# Patient Record
Sex: FEMALE | Race: OTHER
[De-identification: names, ages, dates, MRNs, and addresses within clinical notes are randomized per-mention and may not be internally consistent; named-entity substitution may affect disease eponyms.]

---

## 2017-11-27 ENCOUNTER — HOSPITAL ENCOUNTER (EMERGENCY)
Dept: HOSPITAL 61 - ER | Age: 37
Discharge: HOME | End: 2017-11-27
Payer: SELF-PAY

## 2017-11-27 VITALS — BODY MASS INDEX: 22.5 KG/M2 | HEIGHT: 66 IN | WEIGHT: 140 LBS

## 2017-11-27 VITALS — SYSTOLIC BLOOD PRESSURE: 107 MMHG | DIASTOLIC BLOOD PRESSURE: 54 MMHG

## 2017-11-27 DIAGNOSIS — R10.30: ICD-10-CM

## 2017-11-27 DIAGNOSIS — O26.891: ICD-10-CM

## 2017-11-27 DIAGNOSIS — O20.9: Primary | ICD-10-CM

## 2017-11-27 DIAGNOSIS — Z3A.01: ICD-10-CM

## 2017-11-27 LAB
ANION GAP SERPL CALC-SCNC: 6 MMOL/L (ref 6–14)
BACTERIA #/AREA URNS HPF: 0 /HPF
BASOPHILS # BLD AUTO: 0.1 X10^3/UL (ref 0–0.2)
BASOPHILS NFR BLD: 1 % (ref 0–3)
BILIRUB UR QL STRIP: NEGATIVE
BUN SERPL-MCNC: 14 MG/DL (ref 7–20)
CALCIUM SERPL-MCNC: 9.2 MG/DL (ref 8.5–10.1)
CHLORIDE SERPL-SCNC: 105 MMOL/L (ref 98–107)
CO2 SERPL-SCNC: 29 MMOL/L (ref 21–32)
CREAT SERPL-MCNC: 0.6 MG/DL (ref 0.6–1)
EOSINOPHIL NFR BLD: 4 % (ref 0–3)
ERYTHROCYTE [DISTWIDTH] IN BLOOD BY AUTOMATED COUNT: 13.3 % (ref 11.5–14.5)
GFR SERPLBLD BASED ON 1.73 SQ M-ARVRAT: 113.1 ML/MIN
GLUCOSE SERPL-MCNC: 98 MG/DL (ref 70–99)
GLUCOSE UR STRIP-MCNC: NEGATIVE MG/DL
HCT VFR BLD CALC: 34.4 % (ref 36–47)
HGB BLD-MCNC: 11.6 G/DL (ref 12–15.5)
LYMPHOCYTES # BLD: 1.8 X10^3/UL (ref 1–4.8)
LYMPHOCYTES NFR BLD AUTO: 33 % (ref 24–48)
MCH RBC QN AUTO: 32 PG (ref 25–35)
MCHC RBC AUTO-ENTMCNC: 34 G/DL (ref 31–37)
MCV RBC AUTO: 94 FL (ref 79–100)
MONOCYTES NFR BLD: 8 % (ref 0–9)
NEUTROPHILS NFR BLD AUTO: 54 % (ref 31–73)
NITRITE UR QL STRIP: NEGATIVE
PH UR STRIP: 6.5 [PH]
PLATELET # BLD AUTO: 288 X10^3/UL (ref 140–400)
POTASSIUM SERPL-SCNC: 4 MMOL/L (ref 3.5–5.1)
PROT UR STRIP-MCNC: NEGATIVE MG/DL
RBC # BLD AUTO: 3.66 X10^6/UL (ref 3.5–5.4)
RBC #/AREA URNS HPF: (no result) /HPF (ref 0–2)
SODIUM SERPL-SCNC: 140 MMOL/L (ref 136–145)
SP GR UR STRIP: 1.02
SQUAMOUS #/AREA URNS LPF: (no result) /LPF
UROBILINOGEN UR-MCNC: 1 MG/DL
WBC # BLD AUTO: 5.5 X10^3/UL (ref 4–11)
WBC #/AREA URNS HPF: (no result) /HPF (ref 0–4)

## 2017-11-27 PROCEDURE — 36415 COLL VENOUS BLD VENIPUNCTURE: CPT

## 2017-11-27 PROCEDURE — 81025 URINE PREGNANCY TEST: CPT

## 2017-11-27 PROCEDURE — 76801 OB US < 14 WKS SINGLE FETUS: CPT

## 2017-11-27 PROCEDURE — 87086 URINE CULTURE/COLONY COUNT: CPT

## 2017-11-27 PROCEDURE — 81001 URINALYSIS AUTO W/SCOPE: CPT

## 2017-11-27 PROCEDURE — 80048 BASIC METABOLIC PNL TOTAL CA: CPT

## 2017-11-27 PROCEDURE — 86901 BLOOD TYPING SEROLOGIC RH(D): CPT

## 2017-11-27 PROCEDURE — 84702 CHORIONIC GONADOTROPIN TEST: CPT

## 2017-11-27 PROCEDURE — 86850 RBC ANTIBODY SCREEN: CPT

## 2017-11-27 PROCEDURE — 85025 COMPLETE CBC W/AUTO DIFF WBC: CPT

## 2017-11-27 PROCEDURE — 86900 BLOOD TYPING SEROLOGIC ABO: CPT

## 2017-11-27 NOTE — RAD
OB ultrasound less than 14 weeks and transvaginal OB ultrasound

 

HISTORY: Abdominal pain vaginal bleeding and pregnancy

 

Sonographic examination of the pregnancy was performed by transabdominal 

and endovaginal technique. Multiple static images were obtained.

 

OB ultrasound first trimester:

 

The uterus appears normal. There is no gestational sac seen.

 

OB ultrasound transvaginal:

 

The endometrium of the uterus measures 10 mm in thickness. The right ovary

appears normal with normal blood flow measures 1.1 x 2.4 x 1.1 cm. The 

left ovary appears normal with normal blood flow measures 1.4 x 2.7 x 1.8 

cm.

 

There is no free fluid.

 

IMPRESSION:

 

No sonographic evidence of pregnancy. An early less than 4 weeks pregnancy

is possible. Miscarriage or blighted ovum is possible.

 

Recommend correlation with serial quantitative beta-hCG. If the pregnancy 

progresses beyond 6 weeks a repeat ultrasound is recommended in order to 

document an intrauterine pregnancy and thereby exclude ectopic pregnancy.

 

Electronically signed by: Steve Etienne III, MD (11/27/2017 7:06 PM) [facility]

## 2017-11-27 NOTE — PHYS DOC
Past Medical History


Past Medical History:  No Pertinent History


Past Surgical History:  No Surgical History


Alcohol Use:  None


Drug Use:  None





Adult General


Chief Complaint


Chief Complaint:  VAGINAL BLEEDING PREGNANCY





HPI


HPI





Patient is a 37  year old female who presents with vaginal bleeding in early 

pregnancy.  The patient is , pregnant at 10w2d by dates (LMP ), reports 

1 week ago she had vaginal bleeding with clots & cramping lower abdominal pain.

  She is concerned that she experienced miscarriage.  Currently no bleeding or 

pain.  Denies fevers/chills, nausea, vomiting, diarrhea, dysuria/hematuria, 

vaginal discharge.  She went to a clinic this morning & was instructed to come 

here for further evaluation.





Review of Systems


Review of Systems


Constitutional: Denies fever or chills 


HENT: Denies nasal congestion or sore throat 


Respiratory: Denies cough or shortness of breath 


Cardiovascular:  Denies chest pain


GI: Reports history of abdominal pain, denies nausea, vomiting


: Denies dysuria or hematuria, reports history of vaginal bleeding. 


Musculoskeletal: Denies back pain or joint pain 


Integument: Denies rash or skin lesions 


Neurologic: Denies headache, focal weakness or sensory changes 


All other systems were reviewed and found to be within normal limits, except as 

documented in this note.





Allergies


Allergies





Allergies








Coded Allergies Type Severity Reaction Last Updated Verified


 


  No Known Drug Allergies    17 No











Physical Exam


Physical Exam


Constitutional: Well developed, well nourished, no acute distress, non-toxic 

appearance. 


HENT: Normocephalic, atraumatic, bilateral external ears normal, oropharynx 

moist, nose normal.


Eyes: conjunctiva normal, no discharge.


Neck: supple, no stridor.


Cardiovascular:  RRR, no murmurs, no edema. 


Lungs & Thorax:  LCTAB, no wheezing, no respiratory distress.


Abdomen: soft, nontender, nondistended.  no masses or pulsatile masses, no 

rebound/guarding.


Skin: Warm, dry, no erythema, no rash.


Back: No CVA tenderness.


Extremities: No tenderness, no edema. 


Neurologic: Alert and oriented X 3, no focal deficits noted. 


Psychologic: Affect normal, judgement normal, mood normal.





Current Patient Data


Vital Signs





 Vital Signs








  Date Time  Temp Pulse Resp B/P (MAP) Pulse Ox O2 Delivery O2 Flow Rate FiO2


 


17 20:57  78 20 107/54 (71) 98 Room Air  


 


17 17:30 98.4       





 98.4       








Lab Values





 Laboratory Tests








Test


  17


17:24 17


17:28 17


17:51


 


Urine Collection Type Unknown    


 


Urine Color Yellow    


 


Urine Clarity Clear    


 


Urine pH 6.5    


 


Urine Specific Gravity 1.025    


 


Urine Protein


  Negative mg/dL


(NEG-TRACE) 


  


 


 


Urine Glucose (UA)


  Negative mg/dL


(NEG) 


  


 


 


Urine Ketones (Stick)


  Negative mg/dL


(NEG) 


  


 


 


Urine Blood Small (NEG)    


 


Urine Nitrite


  Negative (NEG)


  


  


 


 


Urine Bilirubin


  Negative (NEG)


  


  


 


 


Urine Urobilinogen Dipstick


  1.0 mg/dL (0.2


mg/dL) 


  


 


 


Urine Leukocyte Esterase Small (NEG)    


 


Urine RBC


  3-5 /HPF (0-2)


  


  


 


 


Urine WBC


  1-4 /HPF (0-4)


  


  


 


 


Urine Squamous Epithelial


Cells Many /LPF  


  


  


 


 


Urine Amorphous Sediment Present /HPF    


 


Urine Bacteria


  0 /HPF (0-FEW)


  


  


 


 


Urine Mucus Marked /LPF    


 


POC Urine HCG, Qualitative


  


  Hcg positive


(Negative) 


 


 


White Blood Count


  


  


  5.5 x10^3/uL


(4.0-11.0)


 


Red Blood Count


  


  


  3.66 x10^6/uL


(3.50-5.40)


 


Hemoglobin


  


  


  11.6 g/dL


(12.0-15.5)  L


 


Hematocrit


  


  


  34.4 %


(36.0-47.0)  L


 


Mean Corpuscular Volume


  


  


  94 fL ()


 


 


Mean Corpuscular Hemoglobin   32 pg (25-35)  


 


Mean Corpuscular Hemoglobin


Concent 


  


  34 g/dL


(31-37)


 


Red Cell Distribution Width


  


  


  13.3 %


(11.5-14.5)


 


Platelet Count


  


  


  288 x10^3/uL


(140-400)


 


Neutrophils (%) (Auto)   54 % (31-73)  


 


Lymphocytes (%) (Auto)   33 % (24-48)  


 


Monocytes (%) (Auto)   8 % (0-9)  


 


Eosinophils (%) (Auto)   4 % (0-3)  H


 


Basophils (%) (Auto)   1 % (0-3)  


 


Neutrophils # (Auto)


  


  


  3.0 x10^3uL


(1.8-7.7)


 


Lymphocytes # (Auto)


  


  


  1.8 x10^3/uL


(1.0-4.8)


 


Monocytes # (Auto)


  


  


  0.4 x10^3/uL


(0.0-1.1)


 


Eosinophils # (Auto)


  


  


  0.2 x10^3/uL


(0.0-0.7)


 


Basophils # (Auto)


  


  


  0.1 x10^3/uL


(0.0-0.2)


 


Maternal Serum HCG Beta


Subunit 


  


  442 mIU/mL


(0-5)  H


 


Sodium Level


  


  


  140 mmol/L


(136-145)


 


Potassium Level


  


  


  4.0 mmol/L


(3.5-5.1)


 


Chloride Level


  


  


  105 mmol/L


()


 


Carbon Dioxide Level


  


  


  29 mmol/L


(21-32)


 


Anion Gap   6 (6-14)  


 


Blood Urea Nitrogen


  


  


  14 mg/dL


(7-20)


 


Creatinine


  


  


  0.6 mg/dL


(0.6-1.0)


 


Estimated GFR


(Cockcroft-Gault) 


  


  113.1  


 


 


Glucose Level


  


  


  98 mg/dL


(70-99)


 


Calcium Level


  


  


  9.2 mg/dL


(8.5-10.1)





 Laboratory Tests


17 17:51








 Laboratory Tests


17 17:51











Microbiology


17 Urine Culture - Preliminary, Resulted


           


17 Urine Culture Result 1 (STEPHAN) - Preliminary, Resulted


           








EKG


EKG


[]





Radiology/Procedures


Radiology/Procedures


PROCEDURE: OB < 14 WKS





OB ultrasound less than 14 weeks and transvaginal OB ultrasound


 


HISTORY: Abdominal pain vaginal bleeding and pregnancy


 


Sonographic examination of the pregnancy was performed by transabdominal 


and endovaginal technique. Multiple static images were obtained.


 


OB ultrasound first trimester:


 


The uterus appears normal. There is no gestational sac seen.


 


OB ultrasound transvaginal:


 


The endometrium of the uterus measures 10 mm in thickness. The right ovary


appears normal with normal blood flow measures 1.1 x 2.4 x 1.1 cm. The 


left ovary appears normal with normal blood flow measures 1.4 x 2.7 x 1.8 


cm.


 


There is no free fluid.


 


IMPRESSION:


 


No sonographic evidence of pregnancy. An early less than 4 weeks pregnancy


is possible. Miscarriage or blighted ovum is possible.


 


Recommend correlation with serial quantitative beta-hCG. If the pregnancy 


progresses beyond 6 weeks a repeat ultrasound is recommended in order to 


document an intrauterine pregnancy and thereby exclude ectopic pregnancy.


 


Electronically signed by: Cassius Merritt III, MD (2017 7:06 PM) [facility]














DICTATED and SIGNED BY:     CASSIUS MERRITT III, MD


DATE:     17[]





Course & Med Decision Making


Course & Med Decision Making


Pertinent Labs and Imaging studies reviewed. (See chart for details)


The patient presents for evaluation after abdominal pain & vaginal bleeding in 

first trimester, currently asymptomatic.  Vitals stable.  Obtained labs, UA, 

ultrasound.  No evidence of IUP.  Based on history & LMP, likely consistent 

with spontaneous , completed, but still possibility of early IUP or 

ectopic pregnancy.  Discussed with patient, very important to follow up in 2 

days for repeat BHCG.  May make appointment with Dr. Rice or OB of her choice 

if she can be seen quickly.  Come back for high fever, severe pain, 

uncontrolled vomiting, heavy bleeding requiring greater than 1 pad per hour, 

any otherwise worsening condition.  Discharged home in stable condition.


[]





Dragon Disclaimer


Dragon Disclaimer


This electronic medical record was generated, in whole or in part, using a 

voice recognition dictation system.





Departure


Departure


Impression:  


 Primary Impression:  


 Vaginal bleeding affecting early pregnancy


Disposition:   HOME, SELF-CARE


Condition:  STABLE


Referrals:  


UNKNOWN PCP NAME (PCP)








HILLARY RICE Jr, MD


Patient Instructions:  Vaginal Bleeding During Pregnancy, First Trimester





Additional Instructions:  


You were seen in the emergency department today for vaginal bleeding during 

pregnancy. Based on the date severe last period, we would expect a higher 

pregnancy hormone level. We will would also expect to be able to see a 

developing baby on your ultrasound. Based on the symptoms you have last week, 

it is likely that you had an ultrasound. However there is still a possibility 

that this could be very early pregnancy or a pregnancy developing within your 

tubes which can be life-threatening. It is very important to follow-up with an 

OB doctor for further labs and possibly ultrasound evaluation. Please follow-up 

at the clinic or with Dr. Rice here at [place] within 2 days. Return to 

the emergency department for high fever, severe pain, uncontrolled vomiting, 

heavy bleeding requiring more than 1 pad per hour, any otherwise worsening 

condition.











PLACIDO DIEZ MD 2017 20:28

## 2018-10-08 ENCOUNTER — HOSPITAL ENCOUNTER (INPATIENT)
Dept: HOSPITAL 61 - 3 SO LND | Age: 38
LOS: 2 days | Discharge: HOME | End: 2018-10-10
Attending: SPECIALIST | Admitting: SPECIALIST
Payer: SELF-PAY

## 2018-10-08 VITALS — HEIGHT: 66 IN | BODY MASS INDEX: 28.12 KG/M2 | WEIGHT: 175 LBS

## 2018-10-08 VITALS — SYSTOLIC BLOOD PRESSURE: 110 MMHG | DIASTOLIC BLOOD PRESSURE: 90 MMHG

## 2018-10-08 VITALS — SYSTOLIC BLOOD PRESSURE: 116 MMHG | DIASTOLIC BLOOD PRESSURE: 77 MMHG

## 2018-10-08 VITALS — SYSTOLIC BLOOD PRESSURE: 112 MMHG | DIASTOLIC BLOOD PRESSURE: 76 MMHG

## 2018-10-08 VITALS — DIASTOLIC BLOOD PRESSURE: 76 MMHG | SYSTOLIC BLOOD PRESSURE: 111 MMHG

## 2018-10-08 DIAGNOSIS — Z3A.40: ICD-10-CM

## 2018-10-08 DIAGNOSIS — Z30.2: ICD-10-CM

## 2018-10-08 LAB
AMPHETAMINE/METHAMPHETAMINE: (no result)
APTT PPP: YELLOW S
BACTERIA #/AREA URNS HPF: (no result) /HPF
BARBITURATES UR-MCNC: (no result) UG/ML
BASOPHILS # BLD AUTO: 0 X10^3/UL (ref 0–0.2)
BASOPHILS NFR BLD: 1 % (ref 0–3)
BENZODIAZ UR-MCNC: (no result) UG/L
BILIRUB UR QL STRIP: NEGATIVE
CANNABINOIDS UR-MCNC: (no result) UG/L
COCAINE UR-MCNC: (no result) NG/ML
EOSINOPHIL NFR BLD: 0.2 X10^3/UL (ref 0–0.7)
EOSINOPHIL NFR BLD: 3 % (ref 0–3)
ERYTHROCYTE [DISTWIDTH] IN BLOOD BY AUTOMATED COUNT: 13.1 % (ref 11.5–14.5)
FIBRINOGEN PPP-MCNC: CLEAR MG/DL
HCT VFR BLD CALC: 33.8 % (ref 36–47)
HGB BLD-MCNC: 11.7 G/DL (ref 12–15.5)
LYMPHOCYTES # BLD: 1.5 X10^3/UL (ref 1–4.8)
LYMPHOCYTES NFR BLD AUTO: 26 % (ref 24–48)
MCH RBC QN AUTO: 35 PG (ref 25–35)
MCHC RBC AUTO-ENTMCNC: 35 G/DL (ref 31–37)
MCV RBC AUTO: 101 FL (ref 79–100)
METHADONE SERPL-MCNC: (no result) NG/ML
MONO #: 0.5 X10^3/UL (ref 0–1.1)
MONOCYTES NFR BLD: 9 % (ref 0–9)
NEUT #: 3.7 X10^3UL (ref 1.8–7.7)
NEUTROPHILS NFR BLD AUTO: 62 % (ref 31–73)
NITRITE UR QL STRIP: NEGATIVE
OPIATES UR-MCNC: (no result) NG/ML
PCP SERPL-MCNC: (no result) MG/DL
PH UR STRIP: 5.5 [PH]
PLATELET # BLD AUTO: 174 X10^3/UL (ref 140–400)
PROT UR STRIP-MCNC: NEGATIVE MG/DL
RBC # BLD AUTO: 3.36 X10^6/UL (ref 3.5–5.4)
RBC #/AREA URNS HPF: 0 /HPF (ref 0–2)
SQUAMOUS #/AREA URNS LPF: (no result) /LPF
UROBILINOGEN UR-MCNC: 0.2 MG/DL
WBC # BLD AUTO: 5.9 X10^3/UL (ref 4–11)

## 2018-10-08 PROCEDURE — G0479 DRUG TEST PRESUMP NOT OPT: HCPCS

## 2018-10-08 PROCEDURE — 85025 COMPLETE CBC W/AUTO DIFF WBC: CPT

## 2018-10-08 PROCEDURE — 86592 SYPHILIS TEST NON-TREP QUAL: CPT

## 2018-10-08 PROCEDURE — 0UB70ZZ EXCISION OF BILATERAL FALLOPIAN TUBES, OPEN APPROACH: ICD-10-PCS | Performed by: SPECIALIST

## 2018-10-08 PROCEDURE — 86850 RBC ANTIBODY SCREEN: CPT

## 2018-10-08 PROCEDURE — 10907ZC DRAINAGE OF AMNIOTIC FLUID, THERAPEUTIC FROM PRODUCTS OF CONCEPTION, VIA NATURAL OR ARTIFICIAL OPENING: ICD-10-PCS | Performed by: SPECIALIST

## 2018-10-08 PROCEDURE — 87086 URINE CULTURE/COLONY COUNT: CPT

## 2018-10-08 PROCEDURE — 0UQGXZZ REPAIR VAGINA, EXTERNAL APPROACH: ICD-10-PCS | Performed by: SPECIALIST

## 2018-10-08 PROCEDURE — 3E033VJ INTRODUCTION OF OTHER HORMONE INTO PERIPHERAL VEIN, PERCUTANEOUS APPROACH: ICD-10-PCS | Performed by: SPECIALIST

## 2018-10-08 PROCEDURE — 36415 COLL VENOUS BLD VENIPUNCTURE: CPT

## 2018-10-08 PROCEDURE — 86900 BLOOD TYPING SEROLOGIC ABO: CPT

## 2018-10-08 PROCEDURE — 3E0P7VZ INTRODUCTION OF HORMONE INTO FEMALE REPRODUCTIVE, VIA NATURAL OR ARTIFICIAL OPENING: ICD-10-PCS | Performed by: SPECIALIST

## 2018-10-08 PROCEDURE — 80307 DRUG TEST PRSMV CHEM ANLYZR: CPT

## 2018-10-08 PROCEDURE — 85014 HEMATOCRIT: CPT

## 2018-10-08 PROCEDURE — 86901 BLOOD TYPING SEROLOGIC RH(D): CPT

## 2018-10-08 PROCEDURE — 88302 TISSUE EXAM BY PATHOLOGIST: CPT

## 2018-10-08 PROCEDURE — 90756 CCIIV4 VACC ABX FREE IM: CPT

## 2018-10-08 PROCEDURE — S0028 INJECTION, FAMOTIDINE, 20 MG: HCPCS

## 2018-10-08 PROCEDURE — 81001 URINALYSIS AUTO W/SCOPE: CPT

## 2018-10-08 PROCEDURE — 90471 IMMUNIZATION ADMIN: CPT

## 2018-10-08 RX ADMIN — LIDOCAINE HYDROCHLORIDE PRN ML: 10 INJECTION, SOLUTION EPIDURAL; INFILTRATION; INTRACAUDAL; PERINEURAL at 16:31

## 2018-10-08 RX ADMIN — LIDOCAINE HYDROCHLORIDE PRN ML: 10 INJECTION, SOLUTION EPIDURAL; INFILTRATION; INTRACAUDAL; PERINEURAL at 16:32

## 2018-10-08 RX ADMIN — FENTANYL CITRATE PRN MCG: 50 INJECTION INTRAMUSCULAR; INTRAVENOUS at 12:56

## 2018-10-08 RX ADMIN — FENTANYL CITRATE PRN MCG: 50 INJECTION INTRAMUSCULAR; INTRAVENOUS at 16:31

## 2018-10-08 RX ADMIN — IBUPROFEN PRN MG: 800 TABLET ORAL at 16:31

## 2018-10-08 NOTE — PDOC
VAGINAL DELIVERY


DATE


DATE: 10/8/18 


TIME: 14:54


:  5


Para:  4


EDC:  Oct 8, 2018


VAGINAL DELIVERY:  VTX


VACCUM ASSISTED:  No


PLACENTA:  Spontaneous


APGAR





SEX:  Female


WEIGHT


Weight [ ]


Nuchal Cord:  No


Amniotic Fluid:  Clear


PAIN:  Natural


EPISIOTOMY:  No


EXTENSION:  No


EBL


300cc


COMPLICATIONS


none


CONDITION


Stable


Signs of Intrauterine Infectio:  None


Shoulder Dystocia:  No


DIAGNOSIS


ADALBERTO Watson MD Oct 8, 2018 14:55

## 2018-10-09 VITALS — DIASTOLIC BLOOD PRESSURE: 64 MMHG | SYSTOLIC BLOOD PRESSURE: 105 MMHG

## 2018-10-09 VITALS — DIASTOLIC BLOOD PRESSURE: 76 MMHG | SYSTOLIC BLOOD PRESSURE: 112 MMHG

## 2018-10-09 VITALS — SYSTOLIC BLOOD PRESSURE: 104 MMHG | DIASTOLIC BLOOD PRESSURE: 68 MMHG

## 2018-10-09 VITALS — DIASTOLIC BLOOD PRESSURE: 75 MMHG | SYSTOLIC BLOOD PRESSURE: 113 MMHG

## 2018-10-09 VITALS — DIASTOLIC BLOOD PRESSURE: 77 MMHG | SYSTOLIC BLOOD PRESSURE: 115 MMHG

## 2018-10-09 VITALS — SYSTOLIC BLOOD PRESSURE: 110 MMHG | DIASTOLIC BLOOD PRESSURE: 80 MMHG

## 2018-10-09 VITALS — DIASTOLIC BLOOD PRESSURE: 66 MMHG | SYSTOLIC BLOOD PRESSURE: 110 MMHG

## 2018-10-09 VITALS — DIASTOLIC BLOOD PRESSURE: 79 MMHG | SYSTOLIC BLOOD PRESSURE: 120 MMHG

## 2018-10-09 VITALS — DIASTOLIC BLOOD PRESSURE: 77 MMHG | SYSTOLIC BLOOD PRESSURE: 116 MMHG

## 2018-10-09 RX ADMIN — FENTANYL CITRATE PRN MCG: 50 INJECTION INTRAMUSCULAR; INTRAVENOUS at 11:11

## 2018-10-09 RX ADMIN — BENZOCAINE AND MENTHOL PRN LOZ: 15; 3.6 LOZENGE ORAL at 19:34

## 2018-10-09 RX ADMIN — SODIUM CHLORIDE, SODIUM LACTATE, POTASSIUM CHLORIDE, AND CALCIUM CHLORIDE SCH MLS/HR: .6; .31; .03; .02 INJECTION, SOLUTION INTRAVENOUS at 08:55

## 2018-10-09 RX ADMIN — IBUPROFEN PRN MG: 800 TABLET ORAL at 18:22

## 2018-10-09 RX ADMIN — SODIUM CHLORIDE, SODIUM LACTATE, POTASSIUM CHLORIDE, AND CALCIUM CHLORIDE SCH MLS/HR: .6; .31; .03; .02 INJECTION, SOLUTION INTRAVENOUS at 11:10

## 2018-10-09 RX ADMIN — IBUPROFEN PRN MG: 800 TABLET ORAL at 00:28

## 2018-10-09 RX ADMIN — FENTANYL CITRATE PRN MCG: 50 INJECTION INTRAMUSCULAR; INTRAVENOUS at 10:58

## 2018-10-10 VITALS — SYSTOLIC BLOOD PRESSURE: 116 MMHG | DIASTOLIC BLOOD PRESSURE: 69 MMHG

## 2018-10-10 VITALS — SYSTOLIC BLOOD PRESSURE: 112 MMHG | DIASTOLIC BLOOD PRESSURE: 76 MMHG

## 2018-10-10 VITALS — DIASTOLIC BLOOD PRESSURE: 73 MMHG | SYSTOLIC BLOOD PRESSURE: 114 MMHG

## 2018-10-10 RX ADMIN — BENZOCAINE AND MENTHOL PRN LOZ: 15; 3.6 LOZENGE ORAL at 08:17

## 2018-10-10 RX ADMIN — BENZOCAINE AND MENTHOL PRN LOZ: 15; 3.6 LOZENGE ORAL at 12:07

## 2018-10-10 RX ADMIN — IBUPROFEN PRN MG: 800 TABLET ORAL at 08:17

## 2018-10-10 NOTE — PATHOLOGY
[facility] Accession Number: [id number]

.                                                                01

Material submitted:                                        .

PART A: RIGHT FALLOPIAN TUBE

PART B: LEFT FALLOPIAN TUBE

.                                                                01

Clinician provided ICD-10:

Z30.2

.                                                                01

Clinical history:                                          .

Postpartum tubal ligation

.                                                                02

**********************************************************************

Diagnosis:

A.  Right tubal ligation:

- Segment of fallopian tube confirmed.

.

B.  Left tubal ligation:

- Segment of fallopian tube confirmed.

.

(JPM:carmen; 10/10/2018)

MBR/10/10/2018

**********************************************************************

.                                                                02

Electronically signed:                                     .

Yury Vu MD, Pathologist

NPI- [id number]

.                                                                01

Gross description:                                         .

A. The specimen is received in formalin, labeled "Jaswinder, Andra, right

fallopian tube" and consists of a pink-gray and smooth non-fimbriated

segment of fallopian tube measuring 1.4 x 0.8 x 0.5 cm.  Sectioning

reveals a central lumen and no gross lesions.  The specimen is entirely

submitted in A1.

.

B. The specimen is received in formalin, labeled "Jaswinder, Andra, left

fallopian tube" and consists of a pink-gray and smooth non-fimbriated

segment of fallopian tube measuring 2.3 x 0.8 x 0.5 cm.  Sectioning

reveals a central lumen and no gross lesions.  Representative sections are

submitted in B1.

(SDY; 10/9/2018)

SYU/SYU

.                                                                02

Pathologist provided ICD-10:

Z30.2

.                                                                02

CPT                                                        .

[id number], [id number]

Specimen Comment: A courtesy copy of this report has been sent to

Specimen Comment: [phone].

Specimen Comment: Report sent to 

***Performed at:  [address] [place], [place] [id number] [facility] [address], [place], [place]  [id number]

   MD Anoop Schmitt MD Phone:  [phone]

***Performed at:  02

   [address]  [id number]

   MD Yury Vu MD Phone:  [phone]

## 2018-10-10 NOTE — PDOC3
OB DISCHARGE SUMMARY


DATE OF ADMISSION:  


10/8/18


DATE OF DISCHARGE:  


10/10/18


REASON FOR ADMISSION:   Induction of labor


PRENATAL PROCEDURES:  Ultrasound


INTRAPARTUM PROCEDURES:  Tubal Ligation, Vaginal Laceration


POSTPARTUM PROCEDURES:  None


POSTPARTUM OPERATIONS:  None


DISCHARGE DIAGNOSIS:  Term Pregnancy Delivered


DISCHARGE INFORMATION:  Activity, Diet


HOSPITAL COURSE


Unremarkable


CONDITION AT DISCHARGE


Stable











ADALBERTO PEREIRA MD Oct 10, 2018 10:24